# Patient Record
Sex: FEMALE | Race: WHITE | URBAN - METROPOLITAN AREA
[De-identification: names, ages, dates, MRNs, and addresses within clinical notes are randomized per-mention and may not be internally consistent; named-entity substitution may affect disease eponyms.]

---

## 2020-07-07 ENCOUNTER — LAB OUTSIDE AN ENCOUNTER (OUTPATIENT)
Dept: URBAN - METROPOLITAN AREA CLINIC 96 | Facility: CLINIC | Age: 35
End: 2020-07-07

## 2020-07-14 LAB
A/G RATIO: 1.4
ADALIMUMAB DRUG LEVEL: 13
ALBUMIN: 4
ALKALINE PHOSPHATASE: 52
ALT (SGPT): 8
ANTI-ADALIMUMAB ANTIBODY: <25
AST (SGOT): 12
BASO (ABSOLUTE): 0.1
BASOS: 1
BILIRUBIN, TOTAL: 0.2
BUN/CREATININE RATIO: 16
BUN: 11
CALCIUM: 9.3
CARBON DIOXIDE, TOTAL: 25
CHLORIDE: 102
CREATININE: 0.7
EGFR IF AFRICN AM: 129
EGFR IF NONAFRICN AM: 112
EOS (ABSOLUTE): 0.1
EOS: 1
GLOBULIN, TOTAL: 2.9
GLUCOSE: 97
HEMATOCRIT: 40
HEMATOLOGY COMMENTS:: (no result)
HEMOGLOBIN: 12.9
IMMATURE CELLS: (no result)
IMMATURE GRANS (ABS): 0.1
IMMATURE GRANULOCYTES: 1
LYMPHS (ABSOLUTE): 1.4
LYMPHS: 15
MCH: 28.5
MCHC: 32.3
MCV: 88
MONOCYTES(ABSOLUTE): 0.9
MONOCYTES: 9
NEUTROPHILS (ABSOLUTE): 7
NEUTROPHILS: 73
NRBC: (no result)
PLATELETS: 418
POTASSIUM: 4.2
PROTEIN, TOTAL: 6.9
RBC: 4.53
RDW: 12.7
SODIUM: 140
WBC: 9.5

## 2020-07-15 ENCOUNTER — TELEPHONE ENCOUNTER (OUTPATIENT)
Dept: URBAN - METROPOLITAN AREA CLINIC 92 | Facility: CLINIC | Age: 35
End: 2020-07-15

## 2020-07-30 ENCOUNTER — DASHBOARD ENCOUNTERS (OUTPATIENT)
Age: 35
End: 2020-07-30

## 2020-07-30 ENCOUNTER — OFFICE VISIT (OUTPATIENT)
Dept: URBAN - METROPOLITAN AREA TELEHEALTH 2 | Facility: TELEHEALTH | Age: 35
End: 2020-07-30
Payer: COMMERCIAL

## 2020-07-30 ENCOUNTER — WEB ENCOUNTER (OUTPATIENT)
Dept: URBAN - METROPOLITAN AREA CLINIC 98 | Facility: CLINIC | Age: 35
End: 2020-07-30

## 2020-07-30 DIAGNOSIS — Z09 FOLLOW UP: ICD-10-CM

## 2020-07-30 DIAGNOSIS — Z79.899 LONG-TERM USE OF IMMUNOSUPPRESSANT MEDICATION: ICD-10-CM

## 2020-07-30 DIAGNOSIS — R79.89 ELEVATED PLATELET COUNT: ICD-10-CM

## 2020-07-30 DIAGNOSIS — K50.013 CROHN'S DISEASE OF ILEUM WITH FISTULA: ICD-10-CM

## 2020-07-30 PROCEDURE — G8482 FLU IMMUNIZE ORDER/ADMIN: HCPCS | Performed by: INTERNAL MEDICINE

## 2020-07-30 PROCEDURE — 1036F TOBACCO NON-USER: CPT | Performed by: INTERNAL MEDICINE

## 2020-07-30 PROCEDURE — G9903 PT SCRN TBCO ID AS NON USER: HCPCS | Performed by: INTERNAL MEDICINE

## 2020-07-30 PROCEDURE — G8420 CALC BMI NORM PARAMETERS: HCPCS | Performed by: INTERNAL MEDICINE

## 2020-07-30 PROCEDURE — 99214 OFFICE O/P EST MOD 30 MIN: CPT | Performed by: INTERNAL MEDICINE

## 2020-07-30 PROCEDURE — 3017F COLORECTAL CA SCREEN DOC REV: CPT | Performed by: INTERNAL MEDICINE

## 2020-07-30 PROCEDURE — G9622 NO UNHEAL ETOH USER: HCPCS | Performed by: INTERNAL MEDICINE

## 2020-07-30 PROCEDURE — G8427 DOCREV CUR MEDS BY ELIG CLIN: HCPCS | Performed by: INTERNAL MEDICINE

## 2020-07-30 RX ORDER — ACETAMINOPHEN 325 MG/1
TABLET, FILM COATED ORAL
Qty: 0 | Refills: 0 | Status: ACTIVE | COMMUNITY
Start: 1900-01-01 | End: 1900-01-01

## 2020-07-30 RX ORDER — ACETAMINOPHEN, ASPIRIN (NSAID) AND CAFFEINE 250; 250; 65 MG/1; MG/1; MG/1
TABLET, FILM COATED ORAL
Qty: 0 | Refills: 0 | Status: ACTIVE | COMMUNITY
Start: 1900-01-01 | End: 1900-01-01

## 2020-07-30 RX ORDER — AZATHIOPRINE 50 1/1
TAKE 3 TABLETS BY ORAL ROUTE DAILY FOR 90 DAYS TABLET ORAL 1
Qty: 270 | Refills: 4 | Status: ACTIVE | COMMUNITY
Start: 2020-04-10 | End: 2021-07-04

## 2020-07-30 RX ORDER — HYOSCYAMINE SULFATE 0.12 MG/1
PLACE 1 TABLET (0.125 MG) BY SUBLINGUAL ROUTE 4 TIMES PER DAY PRN TABLET, ORALLY DISINTEGRATING ORAL
Qty: 60 | Refills: 3 | Status: ACTIVE | COMMUNITY
Start: 2017-05-31 | End: 1900-01-01

## 2020-07-30 RX ORDER — ZOLPIDEM TARTRATE 5 MG/1
TABLET, FILM COATED ORAL
Qty: 0 | Refills: 0 | Status: ACTIVE | COMMUNITY
Start: 1900-01-01 | End: 1900-01-01

## 2020-07-30 RX ORDER — CETIRIZINE HYDROCHLORIDE AND PSEUDOEPHEDRINE HYDROCHLORIDE 5; 120 MG/1; MG/1
TABLET, FILM COATED, EXTENDED RELEASE ORAL
Qty: 0 | Refills: 0 | Status: ACTIVE | COMMUNITY
Start: 1900-01-01 | End: 1900-01-01

## 2020-07-30 RX ORDER — AZATHIOPRINE 50 1/1
AS DIRECTED TABLET ORAL QDAY
Qty: 90 | Refills: 1
Start: 2020-04-10 | End: 2020-10-07

## 2020-07-30 NOTE — HPI-OTHER HISTORIES
. Pt Dayo 36 y/o individual, w/ h/o UC s/p total colectomy and J-pouch, currently on Humira (started 2012 and on weekly Humira) and Imuran (150mg started 5/2019), who is here for f/u. . Pt is referred by Dr. Castillo and Dr. Delgado. . Pt was diagnosed initially with UC at age 12/13.  Quite severe at initial presentation, unable to get of steroids; Had total colectomy, 2 step with J-pouch at age 12.  Did well for 10 years, had a bowel obstruction at 22, had surgery and had adhesions removed.  She did well until age [age], and had recurrent pouchitis, and was started on Humira.  She developed fistula after age 29, but stopped Humira for about 1 year and then re-started it.  She had to have drainage of taylor-rectal abscess/fistula, partly due to catheter placement by IR (via buttocks). . 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  She saw Dr. Munir Beltran and surgeon, Dr. Hernandez and had placement of titanium clip (bear claw) for fistula closure.  However, one week later, the clip failed and developed abscess.  She then saw Dr. Delgado, who placed setons to keep the drainage site open.  In Feb 2019, had adjustment of the seton. . Previously on 4/8/2019, she reports that she has no N/V, but recently developed N/V/abdominal pain, thought it was food poisoning.  Seems to be worse with creamy foods.  She lost a few pounds at that time, but has regained it.  The pain was severe in nature, diffuse, nothing made it better or worse.  This was not as severe as when she had an SBO.  She has 4-5 BM per day, variable consistency, but mostly constipation.  She takes hydrocodone for pain. No blood in the stool, but does see mucus in the stool.  Humira last dose 6 days ago. . Previously on 5/20/2019, she reports that she has severe fatigue and sleepiness.  She reports that her fistula is controlled by the seton, but still draining and pelvic pain.  No recent nausea or vomiting.  She is also experiencing significant dizziness.  We have started on Imuran. . Previously on 8/21/2019, she reports that since starting Imuran, she is noticing less drainage in her perianal area. . Previously on 2/3/2020, pt reports that her fistula (since starting Imuran) it would get better but then stopped working.  Her fistula is actively draining.  She had a slight flare and did not get as sick as she usually does.  She is having 2-4 BM per day, more solid in consistency.  She is more constipated than prior.  She took her Humira 2 days prior. . Previously on 5/20/2020, pt reports that she has been experiencing severe fistula pain for past week.  She has a lot of pain at the scar tissue (perhaps sealed off), does not have seton in place. . Today on 7/30/2020, pt reports that after reduction of the Imuran from 150mg to 100mg, she has noticed that her fistula has worsened.  Also having a lot of stress and also is moving.  She went ahead and increased the dose to 150mg, which has helped.    . Joint pain in her knees and hands.  No eye pain. . 8/2019: FinalPathologicDiagnosis A Terminalileum,biopsy Focalactiveileitis.Nogranulomasordysplasiaseen.Seecomment.     COMMENT: Theilealbiopsyrevealsacuteinflammatorycellswithinvillousepithelium.Changessuchasthesemaybeseenwith ischemicenteritis,viruses,bacterialinfections,followingNSAIDuse,andidiopathicinflammatoryboweldisease.Clinical, endoscopicandpathologiccorrelationisrequired.     B Rectum,biopsy Entericmucosawithmildactiveinflammation.Nogranulomasordysplasiaseen. Labs: 2/2020: Hgb 12.8, ast 12, alt 9, 6-, 6-MMP 4717, quant-gold neg 11/2019: WBC 11.7, ast 16, alt 11, Hgb 14.4 7/2019: WBC 12.3, Ast 13, Alt 9, Humira 19 (Ab 57), CRP 46 4/2019: Crp 26.7, Ferritin 14, B12 674, ESR 32, quanto-gold neg, Iron Sat 6, Ast 15, Alt 13, Hgb 11.4; Humira level 8.8, AB level 37 (2 iron infusions given) . OSH CT scan: see scan: 11/2018: fluid collection in lower pelvic region adjacent to pouch; mucosal enhancement of the pouch suggestive of inflammatory change; cyst in adnexa . OSH Biopsy 2/2019: fistula resection: granulation tissue with acute and chronic inflammation . 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  Plan for pregnancy . 2018: see scan: normal examination of small bowel with no ulcers .

## 2020-08-12 ENCOUNTER — ERX REFILL RESPONSE (OUTPATIENT)
Age: 35
End: 2020-08-12

## 2020-08-12 RX ORDER — CIPROFLOXACIN HYDROCHLORIDE 500 MG/1
TAKE 1 TABLET BY MOUTH EVERY 12 HOURS TABLET, FILM COATED ORAL
Qty: 28 | Refills: 0

## 2020-09-23 ENCOUNTER — TELEPHONE ENCOUNTER (OUTPATIENT)
Dept: URBAN - METROPOLITAN AREA CLINIC 92 | Facility: CLINIC | Age: 35
End: 2020-09-23

## 2020-09-24 ENCOUNTER — TELEPHONE ENCOUNTER (OUTPATIENT)
Dept: URBAN - METROPOLITAN AREA CLINIC 92 | Facility: CLINIC | Age: 35
End: 2020-09-24

## 2020-09-24 RX ORDER — CIPROFLOXACIN HYDROCHLORIDE 500 MG/1
1 TABLET TABLET, FILM COATED ORAL
Qty: 60 TABLET | Refills: 0

## 2020-09-24 RX ORDER — METRONIDAZOLE 500 MG/1
1 TABLET TABLET, FILM COATED ORAL
Qty: 60 | Refills: 0 | OUTPATIENT
Start: 2020-09-24 | End: 2020-10-24

## 2020-09-24 RX ORDER — TRAMADOL HYDROCHLORIDE 50 MG/1
1 TABLET AS NEEDED TABLET, FILM COATED ORAL BID PRN
Qty: 60 | Refills: 3 | OUTPATIENT
Start: 2020-09-24

## 2020-10-08 ENCOUNTER — TELEPHONE ENCOUNTER (OUTPATIENT)
Dept: URBAN - METROPOLITAN AREA CLINIC 92 | Facility: CLINIC | Age: 35
End: 2020-10-08

## 2020-10-30 ENCOUNTER — TELEPHONE ENCOUNTER (OUTPATIENT)
Dept: URBAN - METROPOLITAN AREA CLINIC 92 | Facility: CLINIC | Age: 35
End: 2020-10-30

## 2020-10-30 RX ORDER — ACETAMINOPHEN 325 MG/1
TABLET, FILM COATED ORAL
Qty: 0 | Refills: 0 | Status: ACTIVE | COMMUNITY
Start: 1900-01-01 | End: 1900-01-01

## 2020-10-30 RX ORDER — CIPROFLOXACIN HYDROCHLORIDE 500 MG/1
1 TABLET TABLET, FILM COATED ORAL
Qty: 60 TABLET | Refills: 0 | Status: ACTIVE | COMMUNITY

## 2020-10-30 RX ORDER — ONDANSETRON HYDROCHLORIDE 4 MG/1
1 TABLET TABLET, FILM COATED ORAL
Qty: 60 | Refills: 2 | OUTPATIENT
Start: 2020-11-02

## 2020-10-30 RX ORDER — ACETAMINOPHEN, ASPIRIN (NSAID) AND CAFFEINE 250; 250; 65 MG/1; MG/1; MG/1
TABLET, FILM COATED ORAL
Qty: 0 | Refills: 0 | Status: ACTIVE | COMMUNITY
Start: 1900-01-01 | End: 1900-01-01

## 2020-10-30 RX ORDER — TRAMADOL HYDROCHLORIDE 50 MG/1
1 TABLET AS NEEDED TABLET, FILM COATED ORAL BID PRN
Qty: 60 | Refills: 3 | Status: ACTIVE | COMMUNITY
Start: 2020-09-24

## 2020-10-30 RX ORDER — CETIRIZINE HYDROCHLORIDE AND PSEUDOEPHEDRINE HYDROCHLORIDE 5; 120 MG/1; MG/1
TABLET, FILM COATED, EXTENDED RELEASE ORAL
Qty: 0 | Refills: 0 | Status: ACTIVE | COMMUNITY
Start: 1900-01-01 | End: 1900-01-01

## 2020-10-30 RX ORDER — ZOLPIDEM TARTRATE 5 MG/1
TABLET, FILM COATED ORAL
Qty: 0 | Refills: 0 | Status: ACTIVE | COMMUNITY
Start: 1900-01-01 | End: 1900-01-01

## 2020-10-30 RX ORDER — ADALIMUMAB 40MG/0.4ML
AS DIRECTED KIT SUBCUTANEOUS
Qty: 2 | Refills: 11 | OUTPATIENT
Start: 2020-11-02 | End: 2020-11-14

## 2020-10-30 RX ORDER — HYOSCYAMINE SULFATE 0.12 MG/1
PLACE 1 TABLET (0.125 MG) BY SUBLINGUAL ROUTE 4 TIMES PER DAY PRN TABLET, ORALLY DISINTEGRATING ORAL
Qty: 60 | Refills: 3 | Status: ACTIVE | COMMUNITY
Start: 2017-05-31 | End: 1900-01-01

## 2020-12-17 ENCOUNTER — TELEPHONE ENCOUNTER (OUTPATIENT)
Dept: URBAN - METROPOLITAN AREA CLINIC 92 | Facility: CLINIC | Age: 35
End: 2020-12-17

## 2020-12-17 RX ORDER — HYOSCYAMINE SULFATE 0.12 MG/1
PLACE 1 TABLET (0.125 MG) BY SUBLINGUAL ROUTE 4 TIMES PER DAY PRN TABLET, ORALLY DISINTEGRATING ORAL
Qty: 60 | Refills: 3 | Status: ACTIVE | COMMUNITY
Start: 2017-05-31 | End: 1900-01-01

## 2020-12-17 RX ORDER — NYSTATIN 100000 [USP'U]/G
1 APPLICATION OINTMENT TOPICAL TWICE A DAY
Qty: 1 TUBE | Refills: 0 | OUTPATIENT
Start: 2020-12-18 | End: 2021-01-17

## 2020-12-17 RX ORDER — ZOLPIDEM TARTRATE 5 MG/1
TABLET, FILM COATED ORAL
Qty: 0 | Refills: 0 | Status: ACTIVE | COMMUNITY
Start: 1900-01-01 | End: 1900-01-01

## 2020-12-17 RX ORDER — CIPROFLOXACIN HYDROCHLORIDE 500 MG/1
1 TABLET TABLET, FILM COATED ORAL
Qty: 60 TABLET | Refills: 0 | Status: ACTIVE | COMMUNITY

## 2020-12-17 RX ORDER — TRAMADOL HYDROCHLORIDE 50 MG/1
1 TABLET AS NEEDED TABLET, FILM COATED ORAL BID PRN
Qty: 60 | Refills: 3 | Status: ACTIVE | COMMUNITY
Start: 2020-09-24

## 2020-12-17 RX ORDER — ACETAMINOPHEN 325 MG/1
TABLET, FILM COATED ORAL
Qty: 0 | Refills: 0 | Status: ACTIVE | COMMUNITY
Start: 1900-01-01 | End: 1900-01-01

## 2020-12-17 RX ORDER — ONDANSETRON HYDROCHLORIDE 4 MG/1
1 TABLET TABLET, FILM COATED ORAL
Qty: 60 | Refills: 2 | Status: ACTIVE | COMMUNITY
Start: 2020-11-02

## 2020-12-17 RX ORDER — CIPROFLOXACIN HYDROCHLORIDE 500 MG/1
1 TABLET TABLET, FILM COATED ORAL
Qty: 28 TABLET | Refills: 0 | OUTPATIENT
Start: 2020-12-18 | End: 2021-01-01

## 2020-12-17 RX ORDER — ACETAMINOPHEN, ASPIRIN (NSAID) AND CAFFEINE 250; 250; 65 MG/1; MG/1; MG/1
TABLET, FILM COATED ORAL
Qty: 0 | Refills: 0 | Status: ACTIVE | COMMUNITY
Start: 1900-01-01 | End: 1900-01-01

## 2020-12-17 RX ORDER — CETIRIZINE HYDROCHLORIDE AND PSEUDOEPHEDRINE HYDROCHLORIDE 5; 120 MG/1; MG/1
TABLET, FILM COATED, EXTENDED RELEASE ORAL
Qty: 0 | Refills: 0 | Status: ACTIVE | COMMUNITY
Start: 1900-01-01 | End: 1900-01-01

## 2021-03-22 ENCOUNTER — TELEPHONE ENCOUNTER (OUTPATIENT)
Dept: URBAN - METROPOLITAN AREA CLINIC 92 | Facility: CLINIC | Age: 36
End: 2021-03-22

## 2021-03-29 ENCOUNTER — WEB ENCOUNTER (OUTPATIENT)
Dept: URBAN - METROPOLITAN AREA CLINIC 98 | Facility: CLINIC | Age: 36
End: 2021-03-29

## 2021-04-02 ENCOUNTER — WEB ENCOUNTER (OUTPATIENT)
Dept: URBAN - METROPOLITAN AREA CLINIC 98 | Facility: CLINIC | Age: 36
End: 2021-04-02

## 2021-11-22 ENCOUNTER — TELEPHONE ENCOUNTER (OUTPATIENT)
Dept: URBAN - METROPOLITAN AREA CLINIC 98 | Facility: CLINIC | Age: 36
End: 2021-11-22

## 2021-11-22 RX ORDER — ONDANSETRON HYDROCHLORIDE 4 MG/1
1 TABLET TABLET, FILM COATED ORAL
Qty: 60 | Refills: 2
Start: 2020-11-02